# Patient Record
Sex: MALE | HISPANIC OR LATINO | Employment: FULL TIME | URBAN - METROPOLITAN AREA
[De-identification: names, ages, dates, MRNs, and addresses within clinical notes are randomized per-mention and may not be internally consistent; named-entity substitution may affect disease eponyms.]

---

## 2017-03-10 ENCOUNTER — HOSPITAL ENCOUNTER (EMERGENCY)
Facility: CLINIC | Age: 57
Discharge: HOME | End: 2017-03-10
Attending: EMERGENCY MEDICINE | Admitting: EMERGENCY MEDICINE
Payer: COMMERCIAL

## 2017-03-10 ENCOUNTER — APPOINTMENT (OUTPATIENT)
Dept: GENERAL RADIOLOGY | Facility: CLINIC | Age: 57
End: 2017-03-10
Attending: EMERGENCY MEDICINE
Payer: COMMERCIAL

## 2017-03-10 VITALS
SYSTOLIC BLOOD PRESSURE: 125 MMHG | DIASTOLIC BLOOD PRESSURE: 68 MMHG | RESPIRATION RATE: 16 BRPM | OXYGEN SATURATION: 96 % | TEMPERATURE: 97.9 F | HEART RATE: 59 BPM

## 2017-03-10 DIAGNOSIS — B02.9 HERPES ZOSTER WITHOUT COMPLICATION: ICD-10-CM

## 2017-03-10 DIAGNOSIS — R07.89 CHEST WALL PAIN: ICD-10-CM

## 2017-03-10 PROCEDURE — 99283 EMERGENCY DEPT VISIT LOW MDM: CPT | Mod: 25

## 2017-03-10 PROCEDURE — 71020 XR CHEST 2 VW: CPT

## 2017-03-10 RX ORDER — OXYCODONE HYDROCHLORIDE 5 MG/1
5-10 TABLET ORAL EVERY 4 HOURS PRN
Qty: 20 TABLET | Refills: 0 | Status: SHIPPED | OUTPATIENT
Start: 2017-03-10

## 2017-03-10 ASSESSMENT — ENCOUNTER SYMPTOMS
BACK PAIN: 1
FEVER: 0
ABDOMINAL PAIN: 1
SHORTNESS OF BREATH: 0

## 2017-03-10 NOTE — ED AVS SNAPSHOT
Emergency Department    [address] [place] [address]    Phone:  [phone]    Fax:  [phone]                                       Malcolm Dawson   MRN: [id number]    Department:   Emergency Department   Date of Visit:  3/10/2017           After Visit Summary Signature Page     I have received my discharge instructions, and my questions have been answered. I have discussed any challenges I see with this plan with the nurse or doctor.    ..........................................................................................................................................  Patient/Patient Representative Signature      ..........................................................................................................................................  Patient Representative Print Name and Relationship to Patient    ..................................................               ................................................  Date                                            Time    ..........................................................................................................................................  Reviewed by Signature/Title    ...................................................              ..............................................  Date                                                            Time

## 2017-03-10 NOTE — ED AVS SNAPSHOT
Emergency Department    [address] [facility] [address]    Phone:  [phone]    Fax:  [phone]                                       Malcolm Dawson   MRN: [id number]    Department:   Emergency Department   Date of Visit:  3/10/2017           Patient Information     Date Of Birth          1960        Your diagnoses for this visit were:     Herpes zoster without complication     Chest wall pain        You were seen by Jeanne Steven MD.      Follow-up Information     Follow up with [facility].    Why:  next week for recheck    Contact information:    [address] [address]  [phone]          Follow up with  Emergency Department.    Specialty:  EMERGENCY MEDICINE    Why:  As needed, If symptoms worsen    Contact information:    [address] [facility] [id number] [phone]        Discharge Instructions       Opioid Medication Information    You have been given a prescription for an opioid (narcotic) pain medicine and/or have received a pain medicine while here in the Emergency Department. These medicines can make you drowsy or impaired. You must not drive, operate dangerous equipment, or engage in any other dangerous activities while taking these medications. If you drive while taking these medications, you could be arrested for DUI, or driving under the influence. Do not drink any alcohol while you are taking these medications.   Opioid pain medications can cause addiction. If you have a history of chemical dependency of any type, you are at a higher risk of becoming addicted to pain medications.  Only take these prescribed medications to treat your pain when all other options have been tried. Take it for as short a time and as few doses as possible. Store your pain pills in a secure place, as they are frequently stolen and provide a dangerous opportunity for children or visitors in your house to start  abusing these powerful medications. We will not replace any lost or stolen medicine.  As soon as your pain is better, you should flush all your remaining medication.   Many prescription pain medications contain Tylenol  (acetaminophen), including Vicodin , Tylenol #3 , Norco , Lortab , and Percocet .  You should not take any extra pills of Tylenol  if you are using these prescription medications or you can get very sick.  Do not ever take more than 4000 mg of acetaminophen in any 24 hour period.  All opioids tend to cause constipation. Drink plenty of water and eat foods that have a lot of fiber, such as fruits, vegetables, prune juice, apple juice and high fiber cereal.  Take a laxative if you don t move your bowels at least every other day. Miralax , Milk of Magnesia, Colace , or Senna  can be used to keep you regular.            Discharge References/Attachments     SHINGLES (HERPES ZOSTER) (Bengali)      24 Hour Appointment Hotline       To make an appointment at any [facility] clinic, call [phone] ([phone]). If you don't have a family doctor or clinic, we will help you find one. [facility] clinics are conveniently located to serve the needs of you and your family.             Review of your medicines      START taking        Dose / Directions Last dose taken    oxyCODONE 5 MG IR tablet   Commonly known as:  ROXICODONE   Dose:  5-10 mg   Quantity:  20 tablet        Take 1-2 tablets (5-10 mg) by mouth every 4 hours as needed for pain No driving a car or drinking alcohol for 6 hours after taking this medication.   Refills:  0          Our records show that you are taking the medicines listed below. If these are incorrect, please call your family doctor or clinic.        Dose / Directions Last dose taken    TYLENOL PO        Refills:  0        VALTREX PO        Refills:  0                Prescriptions were sent or printed at these locations (1 Prescription)                   Other Prescriptions                 Printed at Department/Unit printer (1 of 1)         oxyCODONE (ROXICODONE) 5 MG IR tablet                Procedures and tests performed during your visit     Chest XR,  PA & LAT      Orders Needing Specimen Collection     None      Pending Results     No orders found from 3/8/2017 to 3/11/2017.            Pending Culture Results     No orders found from 3/8/2017 to 3/11/2017.             Test Results from your hospital stay     3/10/2017 10:10 AM - Interface, Radiant Ib      Narrative     XR CHEST 2 VW 3/10/2017 9:40 AM     HISTORY: right chest pain    COMPARISON: None        Impression     IMPRESSION: The heart size and pulmonary vasculature are normal. No  evidence of pneumothorax or pleural effusion. The lungs are clear.    CAN BUITRAGO MD                Clinical Quality Measure: Blood Pressure Screening     Your blood pressure was checked while you were in the emergency department today. The last reading we obtained was  BP: 125/68 . Please read the guidelines below about what these numbers mean and what you should do about them.  If your systolic blood pressure (the top number) is less than 120 and your diastolic blood pressure (the bottom number) is less than 80, then your blood pressure is normal. There is nothing more that you need to do about it.  If your systolic blood pressure (the top number) is 120-139 or your diastolic blood pressure (the bottom number) is 80-89, your blood pressure may be higher than it should be. You should have your blood pressure rechecked within a year by a primary care provider.  If your systolic blood pressure (the top number) is 140 or greater or your diastolic blood pressure (the bottom number) is 90 or greater, you may have high blood pressure. High blood pressure is treatable, but if left untreated over time it can put you at risk for heart attack, stroke, or kidney failure. You should have your blood pressure rechecked by a primary care provider within the next 4  "weeks.  If your provider in the emergency department today gave you specific instructions to follow-up with your doctor or provider even sooner than that, you should follow that instruction and not wait for up to 4 weeks for your follow-up visit.        Thank you for choosing [facility]       Thank you for choosing [facility] for your care. Our goal is always to provide you with excellent care. Hearing back from our patients is one way we can continue to improve our services. Please take a few minutes to complete the written survey that you may receive in the mail after you visit with us. Thank you!        [employer] Information     [employer] lets you send messages to your doctor, view your test results, renew your prescriptions, schedule appointments and more. To sign up, go to [email or web address] . Click on \"Log in\" on the left side of the screen, which will take you to the Welcome page. Then click on \"Sign up Now\" on the right side of the page.     You will be asked to enter the access code listed below, as well as some personal information. Please follow the directions to create your username and password.     Your access code is: [email or web address]  Expires: 2017 10:28 AM     Your access code will  in 90 days. If you need help or a new code, please call your [facility] clinic or [phone].        Care EveryWhere ID     This is your Care EveryWhere ID. This could be used by other organizations to access your [facility] medical records  [id number]        After Visit Summary       This is your record. Keep this with you and show to your community pharmacist(s) and doctor(s) at your next visit.                  "

## 2017-03-10 NOTE — ED PROVIDER NOTES
History     Chief Complaint:  Right sided chest pain    HPI   Malcolm Dawson is a 56 year old diabtic male who presents with right sided chest and back pain.  He reports that he has been having right sided back and chest pain for the past 8 days.  He was seen at [facility] yesterday and diagnosed with shingles on the right side.  He was prescribed Acetaminophen and Valtrex.  He reports that his pain became more intense last night, therefore, he drove himself to the ED today.  He states that he feels sharp, pinching pain from his back to the lower right side of his chest.  The pain is worse when he touches the area and moves his thorax.  He works as a  and does not attribute the pain to any physical activity.  He denies any fever, cough, N/V/D, numbness or shortness of breath.    Allergies:  No Known Drug Allergies    Medications:    Valtrex  Tylenol    Past Medical History:    Diabetes mellitus    Past Surgical History:    History reviewed. No pertinent past surgical history.    Family History:    No family history on file    Social History:  The patient was accompanied to the ED by his wife.  Smoking Status: Current every day smoker  Smokeless Tobacco: Negative  Alcohol Use: yes  Marital Status:   [2]    Review of Systems   Constitutional: Negative for fever.   Respiratory: Negative for shortness of breath.    Gastrointestinal: Positive for abdominal pain.   Musculoskeletal: Positive for back pain.   All other systems reviewed and are negative.      Physical Exam   Vitals:  Patient Vitals for the past 24 hrs:   BP Temp Temp src Pulse Resp SpO2   03/10/17 1101 - - - 59 16 96 %   03/10/17 0828 125/68 97.9  F (36.6  C) Oral 60 15 97 %       Physical Exam    Nursing note and vitals reviewed.  Constitutional:  Appears well-developed and well-nourished.   HENT:   Head:    Atraumatic.   Mouth/Throat:   Oropharynx is clear and moist. No oropharyngeal exudate.   Eyes:    Pupils are  equal, round, and reactive to light.   Neck:    Normal range of motion. Neck supple.      No tracheal deviation present. No thyromegaly present.   Cardiovascular:  Normal rate, regular rhythm, no murmur   Pulmonary/Chest: No shortness of breath. Breath sounds are clear and equal without wheezes or crackles.  Abdominal:   Soft. Bowel sounds are normal. Exhibits no distension and      no mass. There is no tenderness.      There is no rebound and no guarding.   Musculoskeletal:  Tenderness on the right lower thoracic area that extends along the dermatome.  Lymphadenopathy:  No cervical adenopathy.   Neurological:   Alert and oriented to person, place, and time.   Skin:    Skin is warm and dry. He has 5 cm diameter erythematous papular rash with grouped papules to the right side of the mid thoracic spine on his back.  No sign of secondary cellulitis.    Emergency Department Course       Imaging:  Radiology findings were communicated with the patient who voiced understanding of the findings.    Chest XR, PA & LAT  IMPRESSION:  The heart size and pulmonary vasculature are normal. No  evidence of pneumothorax or pleural effusion. The lungs are clear.  Reading per radiology    Emergency Department Course:  Nursing notes and vitals reviewed.  I performed an exam of the patient as documented above.   The patient was sent for a CHEST X-RAY while in the emergency department, results above.   At 1021 the patient was rechecked and was updated on the results of his laboratory and imaging studies.   I discussed the treatment plan with the patient. They expressed understanding of this plan and consented to discharge. They will be discharged home with instructions for care and follow up. In addition, the patient will return to the emergency department if their symptoms persist, worsen, if new symptoms arise or if there is any concern.  All questions were answered.    I personally reviewed the laboratory results with the Patient and  answered all related questions prior to discharge.    Impression & Plan      Medical Decision Making:    Malcolm Dawson is a 56 year old male who presents to the emergency department today for evaluation of abdominal pain.  I found him to have intractable pain from his shingles. His physician had only prescribed him acetaminophen so I have prescribed oxycodone for him to better manage his pain.  He was instructed to continue the Valtrex and he could continue the acetaminophen also, and to follow-up with his clinic for any further problems.  His chest X-ray did not show any pneumothorax or pneumonia and I felt comfortable that his pain was due to the shingles and there is no sign of secondary infection.      Diagnosis:    ICD-10-CM    1. Herpes zoster without complication B02.9    2. Chest wall pain R07.89        Discharge Medications:    New Prescriptions    OXYCODONE (ROXICODONE) 5 MG IR TABLET    Take 1-2 tablets (5-10 mg) by mouth every 4 hours as needed for pain No driving a car or drinking alcohol for 6 hours after taking this medication.       Scribe Disclosure:  I, Shagufta Salazar, am serving as a scribe at 9:28 AM on 3/10/2017 to document services personally performed by Jeanne Steven MD, based on my observations and the provider's statements to me.      Shagufta Salazar  3/10/2017    EMERGENCY DEPARTMENT       Jeanne Steven MD  03/11/17 2609

## 2021-10-09 ENCOUNTER — APPOINTMENT (OUTPATIENT)
Dept: MRI IMAGING | Facility: CLINIC | Age: 61
End: 2021-10-09
Attending: EMERGENCY MEDICINE
Payer: COMMERCIAL

## 2021-10-09 ENCOUNTER — HOSPITAL ENCOUNTER (EMERGENCY)
Facility: CLINIC | Age: 61
Discharge: HOME | End: 2021-10-09
Attending: EMERGENCY MEDICINE | Admitting: EMERGENCY MEDICINE
Payer: COMMERCIAL

## 2021-10-09 VITALS
HEART RATE: 48 BPM | RESPIRATION RATE: 18 BRPM | HEIGHT: 69 IN | BODY MASS INDEX: 31.7 KG/M2 | WEIGHT: 214 LBS | TEMPERATURE: 98.7 F | SYSTOLIC BLOOD PRESSURE: 120 MMHG | DIASTOLIC BLOOD PRESSURE: 74 MMHG | OXYGEN SATURATION: 99 %

## 2021-10-09 DIAGNOSIS — M48.02 SPINAL STENOSIS IN CERVICAL REGION: ICD-10-CM

## 2021-10-09 DIAGNOSIS — R20.2 PARESTHESIA: ICD-10-CM

## 2021-10-09 LAB
ANION GAP SERPL CALCULATED.3IONS-SCNC: 7 MMOL/L (ref 3–14)
ATRIAL RATE - MUSE: 51 BPM
BASOPHILS # BLD AUTO: 0.1 10E3/UL (ref 0–0.2)
BASOPHILS NFR BLD AUTO: 1 %
BUN SERPL-MCNC: 20 MG/DL (ref 7–30)
CALCIUM SERPL-MCNC: 8.1 MG/DL (ref 8.5–10.1)
CHLORIDE BLD-SCNC: 111 MMOL/L (ref 94–109)
CO2 SERPL-SCNC: 21 MMOL/L (ref 20–32)
CREAT SERPL-MCNC: 0.89 MG/DL (ref 0.66–1.25)
DIASTOLIC BLOOD PRESSURE - MUSE: NORMAL MMHG
EOSINOPHIL # BLD AUTO: 0.4 10E3/UL (ref 0–0.7)
EOSINOPHIL NFR BLD AUTO: 4 %
ERYTHROCYTE [DISTWIDTH] IN BLOOD BY AUTOMATED COUNT: 13.1 % (ref 10–15)
GFR SERPL CREATININE-BSD FRML MDRD: >90 ML/MIN/1.73M2
GLUCOSE BLD-MCNC: 140 MG/DL (ref 70–99)
HCT VFR BLD AUTO: 45.5 % (ref 40–53)
HGB BLD-MCNC: 14.9 G/DL (ref 13.3–17.7)
HOLD SPECIMEN: NORMAL
HOLD SPECIMEN: NORMAL
IMM GRANULOCYTES # BLD: 0 10E3/UL
IMM GRANULOCYTES NFR BLD: 0 %
INTERPRETATION ECG - MUSE: NORMAL
LYMPHOCYTES # BLD AUTO: 3.3 10E3/UL (ref 0.8–5.3)
LYMPHOCYTES NFR BLD AUTO: 36 %
MCH RBC QN AUTO: 31 PG (ref 26.5–33)
MCHC RBC AUTO-ENTMCNC: 32.7 G/DL (ref 31.5–36.5)
MCV RBC AUTO: 95 FL (ref 78–100)
MONOCYTES # BLD AUTO: 0.9 10E3/UL (ref 0–1.3)
MONOCYTES NFR BLD AUTO: 10 %
NEUTROPHILS # BLD AUTO: 4.5 10E3/UL (ref 1.6–8.3)
NEUTROPHILS NFR BLD AUTO: 49 %
NRBC # BLD AUTO: 0 10E3/UL
NRBC BLD AUTO-RTO: 0 /100
P AXIS - MUSE: 59 DEGREES
PLATELET # BLD AUTO: 285 10E3/UL (ref 150–450)
POTASSIUM BLD-SCNC: 4.4 MMOL/L (ref 3.4–5.3)
PR INTERVAL - MUSE: 212 MS
QRS DURATION - MUSE: 90 MS
QT - MUSE: 428 MS
QTC - MUSE: 394 MS
R AXIS - MUSE: 85 DEGREES
RBC # BLD AUTO: 4.81 10E6/UL (ref 4.4–5.9)
SODIUM SERPL-SCNC: 139 MMOL/L (ref 133–144)
SYSTOLIC BLOOD PRESSURE - MUSE: NORMAL MMHG
T AXIS - MUSE: 64 DEGREES
TROPONIN I SERPL-MCNC: <0.015 UG/L (ref 0–0.04)
VENTRICULAR RATE- MUSE: 51 BPM
WBC # BLD AUTO: 9.2 10E3/UL (ref 4–11)

## 2021-10-09 PROCEDURE — 70551 MRI BRAIN STEM W/O DYE: CPT

## 2021-10-09 PROCEDURE — 84484 ASSAY OF TROPONIN QUANT: CPT | Performed by: EMERGENCY MEDICINE

## 2021-10-09 PROCEDURE — 36415 COLL VENOUS BLD VENIPUNCTURE: CPT | Performed by: EMERGENCY MEDICINE

## 2021-10-09 PROCEDURE — 99285 EMERGENCY DEPT VISIT HI MDM: CPT | Mod: 25

## 2021-10-09 PROCEDURE — 80048 BASIC METABOLIC PNL TOTAL CA: CPT | Performed by: EMERGENCY MEDICINE

## 2021-10-09 PROCEDURE — 72141 MRI NECK SPINE W/O DYE: CPT

## 2021-10-09 PROCEDURE — 93005 ELECTROCARDIOGRAM TRACING: CPT

## 2021-10-09 PROCEDURE — 85025 COMPLETE CBC W/AUTO DIFF WBC: CPT | Performed by: EMERGENCY MEDICINE

## 2021-10-09 RX ORDER — IBUPROFEN 600 MG/1
600 TABLET, FILM COATED ORAL EVERY 6 HOURS PRN
Qty: 20 TABLET | Refills: 0 | Status: SHIPPED | OUTPATIENT
Start: 2021-10-09

## 2021-10-09 ASSESSMENT — ENCOUNTER SYMPTOMS
COUGH: 0
NECK STIFFNESS: 0
NUMBNESS: 1
FEVER: 0
VOMITING: 0
NECK PAIN: 0

## 2021-10-09 ASSESSMENT — MIFFLIN-ST. JEOR: SCORE: 1776.08

## 2021-10-09 NOTE — ED PROVIDER NOTES
The patient was signed out to me by Dr. Mullins to F/U on MRI's and dispo.  MRI brain normal.  I called Vishnu with Neurosurgery regarding his MRI cervical findings of Spinal Stenosis L>R which correlate with C7 symptoms and he was referred to Dr. Ermelinda De Los Santos [facility] to call on Monday for follow-up this week and told symptoms to return to the ED for such as worsened tingling or numbness or weakness.     Jeanne Steven MD  10/09/21 4053

## 2021-10-09 NOTE — ED PROVIDER NOTES
"  History   Chief Complaint:  Numbness       A  was used (patient's daughter).      Malcolm Dawson is a 61 year old male with history of type II diabetes and hyperlipidemia who presents with numbness. The patient reports that he has been experiencing left arm numbness and tingling in all four fingers (not thumb) for the last 1-2 weeks. He woke up and noticed this sensation at 0200 this morning and notified his family (this was the first time he mentioned the sensation to them). The sensation always seems to presents at night. He currently denies neck pain or neck stiffness. The patient is currently on oral diabetes medication and says that he has not been checking his sugars. He currently denies cough, fevers, vomiting, chest pain, or trouble with breathing. His legs have been feeling fine.      Review of Systems   Constitutional: Negative for fever.   Respiratory: Negative for cough.    Cardiovascular: Negative for chest pain.   Gastrointestinal: Negative for vomiting.   Musculoskeletal: Negative for neck pain and neck stiffness.   Neurological: Positive for numbness.   All other systems reviewed and are negative.        Allergies:  No known drug allergies     Medications:  Simvastatin  Metformin  Valtrex    Past Medical History:    Type II diabetes  Anal fissure  Obesity  Myopia with astigmatism  Vitamin D deficiency  Hyperlipidemia    Past Surgical History:    No known surgical history     Family History:    No known family history     Social History:  Accompanied by daughter in the ED  History translated in Sudanese     Physical Exam     Patient Vitals for the past 24 hrs:   BP Temp Pulse Resp SpO2 Height Weight   10/09/21 0550 122/67 -- 52 -- 98 % -- --   10/09/21 0427 122/52 98.7  F (37.1  C) 52 18 96 % 1.753 m (5' 9\") 97.1 kg (214 lb)       Physical Exam  General: Appears well-developed and well-nourished.   Head: No signs of trauma.   Neck: Normal range of motion. No nuchal " rigidity. No cervical adenopathy  CV: Normal rate and regular rhythm.    Resp: Effort normal and breath sounds normal. No respiratory distress.   MSK: Normal range of motion. no edema. No Calf tenderness.  Neuro: The patient is alert and oriented.  Strength in upper/lower extremities normal and symmetrical. Sensation slightly decreased in left hand compared to right but normal in from wrist above bilaterally.  Normal sensation in lower extremities. Speech normal.  Skin: Skin is warm and dry. No rash noted.   Psych: normal mood and affect. behavior is normal.       Emergency Department Course   ECG  ECG obtained at 0441, ECG read at 0517  Sinus bradycardia with 1st degree AV block  Otherwise normal ECG   Rate 51 bpm. OH interval 212 ms. QRS duration 90 ms. QT/QTc 428/394 ms. P-R-T axes 59 85 64.     Imaging:    MR Brain w/o Contrast  1.  No acute intracranial finding. No evidence for recent ischemia, intracranial hemorrhage, or mass.    Cervical spine MRI w/o contrast    (Results Pending)     Report per radiology    Laboratory:     CBC: WBC 9.2, HGB 14.9,      BMP: chloride 111 (H), calcium 8.1 (L), glucose 140 (H) o/w WNL (Creatinine 0.89)     Troponin (Collected 0436): <0.015    Emergency Department Course:  Reviewed:  I reviewed nursing notes, vitals, past medical history and Care Everywhere    Assessments:  0531 I obtained history and examined the patient as noted above.   0648 I rechecked the patient and explained findings.     Disposition:  Care of the patient was transferred to my colleague Dr. Steven pending imaging.     Impression & Plan     Medical Decision Making:  Malcolm Dawson is a 61-year-old gentleman who presents due to numbness primarily to his left hand. He states that he has had a few episodes over the last couple of weeks of waking up with numbness to the left hand particularly in the fingers but not the thumb.  Symptoms are more pronounced this evening prompting him to come to the  hospital.  At the time of my evaluation he states that his symptoms have been improving.  On my evaluation he did report some slight decrease in sensation to the left hand but not the forearm or above on the left side.  He had appropriate  strength and movement and no other deficits were noted.  Patient symptoms sound most consistent with paresthesias likely from peripheral nerve irritation.  I did order an MRI of the brain and cervical spine which are pending.  Patient was signed out to Dr. Steven. If these results do not have concerning acute pathology, the patient can be discharged home with recommendations to follow-up and use NSAIDs as an anti-inflammatory.    Diagnosis:    ICD-10-CM    1. Paresthesia  R20.2        Discharge Medications:  New Prescriptions    IBUPROFEN (ADVIL/MOTRIN) 600 MG TABLET    Take 1 tablet (600 mg) by mouth every 6 hours as needed for other (numbness)       Scribe Disclosure:  SUMANTH, Óscar Brown, am serving as a scribe at 5:31 AM on 10/9/2021 to document services personally performed by Jameel Mullins MD based on my observations and the provider's statements to me.              Jameel Mullnis MD  10/10/21 0026

## 2021-10-09 NOTE — ED TRIAGE NOTES
Left arm tingling that he woke up with at 0200, he went to bed at 2130 last night. He is also having some slight tingline in his right hand.

## 2021-10-11 DIAGNOSIS — M54.2 NECK PAIN: Primary | ICD-10-CM

## 2021-10-12 ENCOUNTER — APPOINTMENT (OUTPATIENT)
Dept: INTERPRETER SERVICES | Facility: CLINIC | Age: 61
End: 2021-10-12
Payer: COMMERCIAL

## 2021-10-13 ENCOUNTER — OFFICE VISIT (OUTPATIENT)
Dept: NEUROSURGERY | Facility: CLINIC | Age: 61
End: 2021-10-13
Payer: COMMERCIAL

## 2021-10-13 VITALS
HEIGHT: 69 IN | DIASTOLIC BLOOD PRESSURE: 66 MMHG | WEIGHT: 231 LBS | RESPIRATION RATE: 18 BRPM | HEART RATE: 72 BPM | SYSTOLIC BLOOD PRESSURE: 122 MMHG | BODY MASS INDEX: 34.21 KG/M2

## 2021-10-13 DIAGNOSIS — M54.12 CERVICAL RADICULOPATHY: Primary | ICD-10-CM

## 2021-10-13 PROCEDURE — 99203 OFFICE O/P NEW LOW 30 MIN: CPT | Performed by: SURGERY

## 2021-10-13 RX ORDER — GABAPENTIN 300 MG/1
300 CAPSULE ORAL 3 TIMES DAILY
Qty: 60 CAPSULE | Refills: 1 | Status: SHIPPED | OUTPATIENT
Start: 2021-10-13

## 2021-10-13 ASSESSMENT — MIFFLIN-ST. JEOR: SCORE: 1853.19

## 2021-10-13 NOTE — PROGRESS NOTES
NEUROSURGERY CONSULTATION ADDENDUM      CONSULTATION ASSESSMENT AND PLAN:    Please see history and physical by Alena Aquino. In summary, patient is a 60 yo male who presents form ED with left arm numbness and weakness. MRI cervical spine reviewed with patient and daughter and shows moderate left then right foraminal narrowing without significant central stenosis. Has tried NSAIDs only. On exam has very mild weakness diffusely in left arm. Discussed trial of gabapentin and physical therapy. If no relief discussed posterior foraminotomy. He will return to clinic in 6 weeks or sooner if new or worsening neurological symptoms.     I spent more than 30 minutes in this apt, examining the pt, reviewing the scans, reviewing notes from chart, discussing treatment options with risks and benefits and coordinating care.     Ermelinda De Los Santos MD

## 2021-10-13 NOTE — LETTER
"    10/13/2021         RE: Malcolm Dawson  [address]  [facility] [address]        Dear Colleague,    Thank you for referring your patient, Malcolm Dawson, to the [facility]. Please see a copy of my visit note below.    Neurosurgery consultation was requested by: Dr. Mullins for evaluation of cervical spinal stenosis  Pain: is absent in the neck  Radicular Pain is absent  Lhermitte sign: denies  Motor complaints: left arm weakness, impaired /grasp/dexterity  Sensory complaints: numbness and tingling in left hand  Gait and balance issues: absent  Bowel or bladder issues: denies  Duration of SX is: for a month  The symptoms are worse with: lifting left arm up  The symptoms are better with: rest  Injury: denies  Severity is: moderate  Patient has tried the following conservative measures: none  NDI score is : 4%  Danelle Bond RN, CNRN          Neurosurgery Consultation   10/13/2021    A/P: Malcolm is here for initial consultation following ED visit at [facility] 10/9/2021 for left arm numbness and weak hand grasp. MRI with moderate left worse than right degenerative foraminal narrowing C6-7. Right sided foraminal narrowing C4-5.     PLAN: physical therapy and gabapentin. May need foraminotomy if he does not improve.     HPI: Malcolm has left arm numbness starts below the shoulder goes into his 4 fingers, not the thumb. Started a month ago, worse this past weekend. Worse at night. Not burning. No neck or arm pain. No trauma. Not dropping items. No gait instability. Has not had any conservative management. He is right hand dominant.     Physical Exam  /66   Pulse 72   Resp 18   Ht 1.753 m (5' 9\")   Wt 104.8 kg (231 lb)   BMI 34.11 kg/m      General: alert, oriented. [place]e speaking. Daughter interpreting.    Motor: normal bulk and tone    Strength:   biceps 5/5 right, 5/5 left   Triceps 5/5 right, 5/5 left   Deltoid 5/5 right, 4+/5 left "   Hand grasp 4+/5 right, 4/5 left   Hip flexors 5/5 right, 5/5 left   Quadriceps: 5/5 right, 5/5 left   Ankle dorsiflexion: 5/5 right, 5/5 left   Extensor hallicus longus: 5/5 right, 5/5 left   Ankle plantar flexion : 5/5 right, 5/5 left     Reflexes: no hyperreflexia. No price.     Imaging: MRI reviewed     Alena Aquino, JOSE ANTONIO-CNP  [facility] Neurosurgery  O: [phone]           NEUROSURGERY CONSULTATION ADDENDUM      CONSULTATION ASSESSMENT AND PLAN:    Please see history and physical by Alena Aquino. In summary, patient is a 60 yo male who presents form ED with left arm numbness and weakness. MRI cervical spine reviewed with patient and daughter and shows moderate left then right foraminal narrowing without significant central stenosis. Has tried NSAIDs only. On exam has very mild weakness diffusely in left arm. Discussed trial of gabapentin and physical therapy. If no relief discussed posterior foraminotomy. He will return to clinic in 6 weeks or sooner if new or worsening neurological symptoms.     I spent more than 30 minutes in this apt, examining the pt, reviewing the scans, reviewing notes from chart, discussing treatment options with risks and benefits and coordinating care.     Ermelinda De Los Santos MD                Again, thank you for allowing me to participate in the care of your patient.        Sincerely,        Ermelinda De Los Santos MD

## 2021-10-13 NOTE — PROGRESS NOTES
"Neurosurgery Consultation   10/13/2021    A/P: Malcolm is here for initial consultation following ED visit at [facility] 10/9/2021 for left arm numbness and weak hand grasp. MRI with moderate left worse than right degenerative foraminal narrowing C6-7. Right sided foraminal narrowing C4-5.     PLAN: physical therapy and gabapentin. May need foraminotomy if he does not improve.     HPI: Malcolm has left arm numbness starts below the shoulder goes into his 4 fingers, not the thumb. Started a month ago, worse this past weekend. Worse at night. Not burning. No neck or arm pain. No trauma. Not dropping items. No gait instability. Has not had any conservative management. He is right hand dominant.     Physical Exam  /66   Pulse 72   Resp 18   Ht 1.753 m (5' 9\")   Wt 104.8 kg (231 lb)   BMI 34.11 kg/m      General: alert, oriented. Danish speaking. Daughter interpreting.    Motor: normal bulk and tone    Strength:   biceps 5/5 right, 5/5 left   Triceps 5/5 right, 5/5 left   Deltoid 5/5 right, 4+/5 left   Hand grasp 4+/5 right, 4/5 left   Hip flexors 5/5 right, 5/5 left   Quadriceps: 5/5 right, 5/5 left   Ankle dorsiflexion: 5/5 right, 5/5 left   Extensor hallicus longus: 5/5 right, 5/5 left   Ankle plantar flexion : 5/5 right, 5/5 left     Reflexes: no hyperreflexia. No price.     Imaging: MRI reviewed     BHARGAVI Pitts  [facility] Neurosurgery  O: [phone]         "

## 2021-10-13 NOTE — PROGRESS NOTES
Neurosurgery consultation was requested by: Dr. Mullins for evaluation of cervical spinal stenosis  Pain: is absent in the neck  Radicular Pain is absent  Lhermitte sign: denies  Motor complaints: left arm weakness, impaired /grasp/dexterity  Sensory complaints: numbness and tingling in left hand  Gait and balance issues: absent  Bowel or bladder issues: denies  Duration of SX is: for a month  The symptoms are worse with: lifting left arm up  The symptoms are better with: rest  Injury: denies  Severity is: moderate  Patient has tried the following conservative measures: none  NDI score is : 4%  Danelle Bond RN, CNRN

## 2021-10-15 ENCOUNTER — APPOINTMENT (OUTPATIENT)
Dept: INTERPRETER SERVICES | Facility: CLINIC | Age: 61
End: 2021-10-15
Payer: COMMERCIAL

## 2021-10-22 ENCOUNTER — THERAPY VISIT (OUTPATIENT)
Dept: PHYSICAL THERAPY | Facility: CLINIC | Age: 61
End: 2021-10-22
Attending: SURGERY
Payer: COMMERCIAL

## 2021-10-22 DIAGNOSIS — M54.12 CERVICAL RADICULOPATHY: ICD-10-CM

## 2021-10-22 PROCEDURE — 97161 PT EVAL LOW COMPLEX 20 MIN: CPT | Mod: GP | Performed by: PHYSICAL THERAPIST

## 2021-10-22 PROCEDURE — 97110 THERAPEUTIC EXERCISES: CPT | Mod: GP | Performed by: PHYSICAL THERAPIST

## 2021-10-22 NOTE — PROGRESS NOTES
Physical Therapy Initial Evaluation  Subjective:    Patient Health History         Pain is reported as 1/10 on pain scale.  General health as reported by patient is fair.  Pertinent medical history includes: diabetes, high blood pressure, numbness/tingling and smoking.   Red flags:  Calf pain-swelling-warmth.  Medical allergies: none.   Surgeries include:  None.        Current occupation is Cleaning.   Primary job tasks include:  Lifting/carrying, prolonged standing, repetitive tasks and pushing/pulling.                                    Objective:  System    Physical Exam    General     ROS    Assessment/Plan:

## 2021-10-22 NOTE — PROGRESS NOTES
Physical Therapy Initial Evaluation  Subjective:  The history is provided by the patient. A  was used.   Therapist Generated HPI Evaluation  Problem details: Pt presents with complaints of L hand numbness/tingling (2nd-5th digits). Pt reported onset of sx's approximately one month ago; ER visit on 10-9-21 related to current sx's. Follow-up with neurosurgery on 10-13-21 with referral to PT. Pt reports no prior history of current sx's; unable to recall incident to have provoked current sx's. Pt reports sx's are minimally present during the day; most prominent during the night. Pt reports no restrictions in his work duties related to current sx's.           This is a new condition.  Condition occurred with:  Insidious onset.  Where condition occurred: for unknown reasons.    and is intermittent.  Pain radiates to:  Hand left.   Since onset symptoms are unchanged.  Associated symptoms:  Numbness and tingling. Symptoms are exacerbated by lying down    Special tests included:  MRI.    Restrictions due to condition include:  Working in normal job without restrictions.                          Objective:  System              Cervical/Thoracic Evaluation    AROM:  AROM Cervical:    Flexion:            WFL  Extension:       WFL  Rotation:         Left: WFL     Right: WFL  Side Bend:      Left: WFL     Right:  WFL        Cervical Myotomes:  Cervical myotomes: wrist extenison: 5/5; wrist flexion: 4+/5;  strength: R 80 lbs; L 70 lbs.          C6 (Biceps):  Left: 5      C7 (Triceps):  Left: 5      C8 (Thumb Ext): Left: 5      T1 (Intrinsics): Left: 5          Cervical Dermatomes:              C6 left:  Normal-light touch       C7 left:  Normal-light touch       C8 left:  Normal-light touch                        Shoulder Evaluation:  ROM:  AROM:    Flexion:  Left:  WNL    Right:  WNL    Abduction:  Left: WNL   Right:  WNL                                                                       General      ROS    Assessment/Plan:    Patient is a 61 year old male with cervical complaints.    Patient has the following significant findings with corresponding treatment plan.                Diagnosis 1:  L cervical radiculopathy  Decreased strength - therapeutic exercise and therapeutic activities  Decreased function - therapeutic activities    Therapy Evaluation Codes:   1) History comprised of:   Personal factors that impact the plan of care:      None.    Comorbidity factors that impact the plan of care are:      None.     Medications impacting care: None.  2) Examination of Body Systems comprised of:   Body structures and functions that impact the plan of care:      Cervical spine.   Activity limitations that impact the plan of care are:      Sleeping.  3) Clinical presentation characteristics are:   Stable/Uncomplicated.  Cumulative Therapy Evaluation is: Low complexity.    Previous and current functional limitations:  (See Goal Flow Sheet for this information)    Short term and Long term goals: (See Goal Flow Sheet for this information)     Communication ability:  Patient appears to be able to clearly communicate and understand verbal and written communication and follow directions correctly.  Treatment Explanation - The following has been discussed with the patient:   RX ordered/plan of care  Anticipated outcomes  Possible risks and side effects  This patient would benefit from PT intervention to resume normal activities.   Rehab potential is good.    Frequency:  1 X week, once daily  Duration:  for 4 weeks  Discharge Plan:  Independent in home treatment program.  Reach maximal therapeutic benefit.    Please refer to the daily flowsheet for treatment today, total treatment time and time spent performing 1:1 timed codes.

## 2021-10-29 ENCOUNTER — THERAPY VISIT (OUTPATIENT)
Dept: PHYSICAL THERAPY | Facility: CLINIC | Age: 61
End: 2021-10-29
Attending: SURGERY
Payer: COMMERCIAL

## 2021-10-29 DIAGNOSIS — M54.12 CERVICAL RADICULOPATHY: ICD-10-CM

## 2021-10-29 PROCEDURE — 97110 THERAPEUTIC EXERCISES: CPT | Mod: GP | Performed by: PHYSICAL THERAPIST

## 2021-10-29 PROCEDURE — 97112 NEUROMUSCULAR REEDUCATION: CPT | Mod: GP | Performed by: PHYSICAL THERAPIST

## 2021-10-29 PROCEDURE — 97140 MANUAL THERAPY 1/> REGIONS: CPT | Mod: GP | Performed by: PHYSICAL THERAPIST

## 2021-11-05 ENCOUNTER — THERAPY VISIT (OUTPATIENT)
Dept: PHYSICAL THERAPY | Facility: CLINIC | Age: 61
End: 2021-11-05
Attending: SURGERY
Payer: COMMERCIAL

## 2021-11-05 DIAGNOSIS — M54.12 CERVICAL RADICULOPATHY: ICD-10-CM

## 2021-11-05 PROCEDURE — 97112 NEUROMUSCULAR REEDUCATION: CPT | Mod: GP | Performed by: PHYSICAL THERAPIST

## 2021-11-05 PROCEDURE — 97110 THERAPEUTIC EXERCISES: CPT | Mod: GP | Performed by: PHYSICAL THERAPIST

## 2021-11-05 PROCEDURE — 97140 MANUAL THERAPY 1/> REGIONS: CPT | Mod: GP | Performed by: PHYSICAL THERAPIST

## 2021-11-12 ENCOUNTER — THERAPY VISIT (OUTPATIENT)
Dept: PHYSICAL THERAPY | Facility: CLINIC | Age: 61
End: 2021-11-12
Payer: COMMERCIAL

## 2021-11-12 DIAGNOSIS — M54.12 CERVICAL RADICULOPATHY: ICD-10-CM

## 2021-11-12 PROCEDURE — 99207 PR NO CHARGE LOS: CPT | Mod: GP | Performed by: PHYSICAL THERAPIST

## 2022-01-24 PROBLEM — M54.12 CERVICAL RADICULOPATHY: Status: RESOLVED | Noted: 2021-10-29 | Resolved: 2022-01-24

## 2022-01-24 NOTE — PROGRESS NOTES
DISCHARGE REPORT    Progress reporting period is from 10-22-21 to 11-12-21. Pt was seen for a total of 4 visits; pt reported absence of sx's at his 4th visit.    SUBJECTIVE  Subjective changes noted by patient:  Reported absence of sx's during the night as well as during the day. Sleeping well with modified pillow set-up.    Current pain level is 0/10.     Previous pain level was  1/10.   Changes in function:  Yes (See Goal flowsheet attached for changes in current functional level)  Adverse reaction to treatment or activity: None    OBJECTIVE  CROM: no provocation of L UE sx's. Sandi's: provocation of anterior shoulder/elbow sx's.        ASSESSMENT/PLAN  Updated problem list and treatment plan: Diagnosis 1:  Cervical radiculopathy   STG/LTGs have been met or progress has been made towards goals:  Yes (See Goal flow sheet completed today.)  Assessment of Progress: The patient has met all of their long term goals.  Self Management Plans:  Patient has been instructed in a home treatment program.  Malcolm continues to require the following intervention to meet STG and LTG's:  PT intervention is no longer required to meet STG/LTG.    Recommendations:  This patient is ready to be discharged from therapy and continue their home treatment program.    Please refer to the daily flowsheet for treatment today, total treatment time and time spent performing 1:1 timed codes.

## 2023-07-08 VITALS
OXYGEN SATURATION: 98 % | WEIGHT: 205 LBS | HEART RATE: 58 BPM | RESPIRATION RATE: 16 BRPM | TEMPERATURE: 97.8 F | HEIGHT: 72 IN | SYSTOLIC BLOOD PRESSURE: 133 MMHG | DIASTOLIC BLOOD PRESSURE: 64 MMHG | BODY MASS INDEX: 27.77 KG/M2

## 2023-07-08 PROCEDURE — 99284 EMERGENCY DEPT VISIT MOD MDM: CPT

## 2023-07-08 PROCEDURE — 87651 STREP A DNA AMP PROBE: CPT | Performed by: EMERGENCY MEDICINE

## 2023-07-09 ENCOUNTER — HOSPITAL ENCOUNTER (EMERGENCY)
Facility: CLINIC | Age: 63
Discharge: HOME | End: 2023-07-09
Attending: EMERGENCY MEDICINE | Admitting: EMERGENCY MEDICINE
Payer: COMMERCIAL

## 2023-07-09 DIAGNOSIS — H66.92 ACUTE LEFT OTITIS MEDIA: ICD-10-CM

## 2023-07-09 DIAGNOSIS — H10.31 ACUTE CONJUNCTIVITIS OF RIGHT EYE, UNSPECIFIED ACUTE CONJUNCTIVITIS TYPE: ICD-10-CM

## 2023-07-09 LAB — GROUP A STREP BY PCR: NOT DETECTED

## 2023-07-09 RX ORDER — METFORMIN HCL 500 MG
2 TABLET, EXTENDED RELEASE 24 HR ORAL
COMMUNITY
Start: 2022-12-22

## 2023-07-09 RX ORDER — LISINOPRIL 10 MG/1
10 TABLET ORAL DAILY
COMMUNITY
Start: 2023-06-26

## 2023-07-09 RX ORDER — LINAGLIPTIN 5 MG/1
5 TABLET, FILM COATED ORAL DAILY
COMMUNITY
Start: 2023-01-20

## 2023-07-09 RX ORDER — SIMVASTATIN 40 MG
40 TABLET ORAL AT BEDTIME
COMMUNITY
Start: 2022-12-22

## 2023-07-09 RX ORDER — POLYMYXIN B SULFATE AND TRIMETHOPRIM 1; 10000 MG/ML; [USP'U]/ML
1-2 SOLUTION OPHTHALMIC 4 TIMES DAILY
Qty: 3 ML | Refills: 0 | Status: SHIPPED | OUTPATIENT
Start: 2023-07-09 | End: 2023-07-16

## 2023-07-09 ASSESSMENT — ACTIVITIES OF DAILY LIVING (ADL): ADLS_ACUITY_SCORE: 33

## 2023-07-09 NOTE — ED PROVIDER NOTES
History     Chief Complaint:  Pharyngitis     The history is provided by the patient.      Malcolm Dawson is a 63 year old male who presents with pharyngitis. The patient reports onset of a sore throat three days ago that is worse on the left side. States that now he also has left ear pain.  Ten days of right eye irritation and discomfort with intermittent tearing.  Daughter interpreting.  I can also understand most of what he says in [place].    Independent Historian:   None - Patient Only    Review of External Notes:   Care Everywhere    Medications:    Reviewed in Care Everywhere and imported    Past Medical History:    Type II diabetes   Anal fissure  Obesity  Hyperlipidemia  Vitamin D deficiency  Microalbuminuria  Tobacco dependence  H. Pylori infection   Gastroenteritis     Physical Exam     Patient Vitals for the past 24 hrs:   BP Temp Temp src Pulse Resp SpO2 Height Weight   07/08/23 2322 133/64 97.8  F (36.6  C) Temporal 58 16 98 % 1.829 m (6') 93 kg (205 lb)      Physical Exam   Eyes:  R sclera injected, pupils equal, R eye tonometry 15  ENT:  White/yellow growth bilateral ear canals, R TM normal, L TM erythematous, no pain with movement of external ear, no mastoid tenderness.  Oropharynx normal.  LN:  L cervical tender adenopathy  Resp:  Non-labored  Neuro:  Alert and cooperative, no temporal artery tenderness  MSkel:  Moving all extremities  Skin:  No rash      Emergency Department Course   Imaging:  No orders to display      Laboratory:  Labs Ordered and Resulted from Time of ED Arrival to Time of ED Departure   GROUP A STREPTOCOCCUS PCR THROAT SWAB - Normal       Result Value    Group A strep by PCR Not Detected        Emergency Department Course & Assessments:     Interventions:  Medications - No data to display     Assessments:  0042 I obtained history and examined the patient as noted above.     Independent Interpretation (X-rays, CTs, rhythm strip):  None    Consultations/Discussion of  Management or Tests:  None        Social Determinants of Health affecting care:   None    Disposition:  The patient was discharged to home.     Impression & Plan    Medical Decision Making:  Asymptomatic ear has canal growth that looks similar to otitis externa but has no pain or inflammation.  Similar appearance to left canal w/presence of otitis media as well.  Will have follow-up with ENT due to unclear reason for the bilateral canal changes.  No mastoiditis.  No strep.  No suggestion of deep space infection of the neck.  Eye pressures normal; this is not acute glaucoma.  No temporal pain or tenderness or visual changes.  Temporal arteritis not suspected.  Imaging and blood work not indicated.  Oral and topical antibiotics.  F/u Ophthalmology if eye symptoms persistent.    Diagnosis:    ICD-10-CM    1. Acute left otitis media  H66.92       2. Acute conjunctivitis of right eye, unspecified acute conjunctivitis type  H10.31            Discharge Medications:  Discharge Medication List as of 7/9/2023 12:59 AM      START taking these medications    Details   amoxicillin-clavulanate (AUGMENTIN) 875-125 MG tablet Take 1 tablet by mouth 2 times daily for 7 days, Disp-14 tablet, R-0, E-Prescribe      trimethoprim-polymyxin b (POLYTRIM) 88867-7.1 UNIT/ML-% ophthalmic solution Place 1-2 drops into the right eye 4 times daily for 7 days, Disp-3 mL, R-0, E-Prescribe            Scribe Disclosure:  Vernell JULIO, am serving as a scribe at 12:38 AM on 7/9/2023 to document services personally performed by Kriss Weaver MD based on my observations and the provider's statements to me.     7/9/2023   Kriss Weaver MD Gosen, Christine Leigh, MD  07/09/23 2457

## 2023-07-09 NOTE — ED TRIAGE NOTES
Triage Assessment     Row Name 07/08/23 6586       Triage Assessment (Adult)    Airway WDL WDL       Respiratory WDL    Respiratory WDL WDL       Skin Circulation/Temperature WDL    Skin Circulation/Temperature WDL WDL       Cardiac WDL    Cardiac WDL WDL       Peripheral/Neurovascular WDL    Peripheral Neurovascular WDL WDL       Cognitive/Neuro/Behavioral WDL    Cognitive/Neuro/Behavioral WDL WDL            Left sided sore throat for the last 3 days, now left ear pain.